# Patient Record
(demographics unavailable — no encounter records)

---

## 2025-05-09 NOTE — HISTORY OF PRESENT ILLNESS
[FreeTextEntry1] : YARED VELAZCO  is a 72 year old  M Annual clinical follow-up.  Interim echocardiogram was technically difficult right atrial enlargement normal right ventricular function aortic stenosis tricuspid regurgitation monitor RV size function.  Tricuspid disease.  Aortic disease.  Pulmonary pressures.  Monitor average heart rate 60s low burden of ectopy recent monitor and echo reviewed Today's EKG demonstrates normal sinus rhythm first-degree AV block prior infarct right bundle branch block.  Follow-up contrast echo.  Follow-up blood work.  No recent blood work.  Ischemic eval if any new symptoms. with a PMH of significant family history of coronary artery disease, dyslipidemia with low HDL, hypertension/HHD, DM, obesity and known coronary artery disease status post prior percutaneous revascularization LAD. Dilated aorta. RBBB. Abnormal EKG. Ebsteins anomaly.  Unable to tolerate cardiac MRI.  In the interim there have been no hospitalizations or procedures. Season is starting at the [facility].   Saw congenital heart specialist, Dr. Rishabh Graf Thyroid biopsy reportedly WNL.  Lab 10/2023 hg 15.6, k 4, creat 0.91, a1c 6.1, LDL66, tick disease panel neg, thyroid function nl Carotid 10/2023 mild nonobx stenosis BL thyroid nodule known CTA chest, abd, pelvis 2/26/20: No aortic aneurysm, dissection, or other acute aortic pathology. Nuclear stress test 2021 no ischemia Cardiac catheterization April 2011, LAD mid 80% tandem lesion, right coronary artery distal 40%, drug-eluting stent to LAD, note that the ascending aorta is uncoiled with no aortic insufficiency.  There is a systolic murmur on exam.  Recommend echocardiogram and blood work.  Continue antiplatelet beta-blocker and lipid-lowering therapy. Instructed to notify our office of any new symptoms. butch loss for long-term cardiovascular health.  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 Prior percutaneous revascularization to LAD 2011. Last stress test 1/2021 no ischemia. Monitor EF. Cont plavix, metoprolol, crestor.  Dilated aorta HTN/HHD, no CRI/CHF Cont norvac/benazepril, toprol. Low salt diet. Weight loss for long-term cardiovascular health.  HL High intensity statin rx. Note low HDL.  Thyroid nodule - status post biopsy. Reportedly stable. DM - following with endocrinologist. Followup with endocrinology regarding diabetes and thyroid  Any questions and concerns were addressed and resolved.

## 2025-06-24 NOTE — HISTORY OF PRESENT ILLNESS
[FreeTextEntry1] : YARED VELAZCO  is a 72 year old  M with a PMH of significant family history of coronary artery disease, dyslipidemia with low HDL, hypertension/HHD, DM, obesity and known coronary artery disease status post prior percutaneous revascularization LAD. Dilated aorta. RBBB. Abnormal EKG. Ebsteins anomaly. There have been symptoms of dizziness.  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  At that time he was brought to [facility].  Records reviewed.  There was reported bradycardia.  He metoprolol was stopped.  He was admitted overnight.  He was seen by [place] cardiology.  He reports having prior dizzy and vertigo episodes when changing position particularly at nighttime.  Follow-up event monitor.  Valve team eval.  Remain off metoprolol at this time.  Last visit echocardiogram was recommended.  Echo has normal left ventricular function there is known Lizabeth anomaly there is aortic stenosis parameters severe moderate tricuspid regurgitation borderline pulmonary pressures mean gradient 35 dimensionless index 0.26 blood work [facility] creatinine 0.9 LDL 64 A1c 6.2 TSH 0.7 hemoglobin 16.4 microalbumin 38 LP(a) within normal limits symptoms resolved in the emergency room.  Inpatient carotid was unremarkable.  Troponins were negative.  Orthostatics were reportedly negative.  Echocardiogram at [place] describes dimensionless index of 0.22 with a valve area less than 1 Unable to tolerate cardiac MRI.  In the interim there have been no hospitalizations or procedures. Season is starting at the [facility].  Saw congenital heart specialist, Dr. Rishabh Graf Thyroid biopsy reportedly WNL.  Annual clinical follow-up. \ nterim echocardiogram was technically difficult right atrial enlargement normal right ventricular function aortic stenosis tricuspid regurgitation  Monitor average heart rate 60s low burden of ectopy  Today's EKG demonstrates normal sinus rhythm first-degree AV block prior infarct right bundle branch block.  No recent blood work.   Lab 10/2023 hg 15.6, k 4, creat 0.91, a1c 6.1, LDL66, tick disease panel neg, thyroid function nl Carotid 10/2023 mild nonobx stenosis BL thyroid nodule known CTA chest, abd, pelvis 2/26/20: No aortic aneurysm, dissection, or other acute aortic pathology. Nuclear stress test 2021 no ischemia Cardiac catheterization April 2011, LAD mid 80% tandem lesion, right coronary artery distal 40%, drug-eluting stent to LAD, note that the ascending aorta is uncoiled with no aortic insufficiency.  monitor RV size function. Tricuspid disease. Aortic disease. Pulmonary pressures.  recent monitor and echo reviewed Follow-up contrast echo.  Follow-up blood work.  Ischemic eval if any new symptoms. There is a systolic murmur on exam.  Continue antiplatelet beta-blocker and lipid-lowering therapy. Instructed to notify our office of any new symptoms. butch loss for long-term cardiovascular health.  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 Prior percutaneous revascularization to LAD 2011. Last stress test 1/2021 no ischemia. Monitor EF. Cont plavix, metoprolol, crestor.  Dilated aorta HTN/HHD, no CRI/CHF Cont norvac/benazepril, toprol. Low salt diet. Weight loss for long-term cardiovascular health.  HL High intensity statin rx. Note low HDL.  Thyroid nodule - status post biopsy. Reportedly stable. DM - following with endocrinologist. Followup with endocrinology regarding diabetes and thyroid  Any questions and concerns were addressed and resolved.

## 2025-07-09 NOTE — REASON FOR VISIT
[Structural Heart and Valve Disease] : structural heart and valve disease [Hyperlipidemia] : hyperlipidemia [Hypertension] : hypertension [Coronary Artery Disease] : coronary artery disease [FreeTextEntry3] : Dr. Nesbitt

## 2025-07-09 NOTE — DISCUSSION/SUMMARY
[Patient] : the patient [With Me] : with me [FreeTextEntry3] : Post-cath [FreeTextEntry1] : 72M w/ PMH as above presenting for evaluation of symptomatic severe aortic stenosis.  He likely has a bicuspid aortic valve and will require a CTA to define the type of bicuspid as well as his aortic measurement.  We will have a heart team approach and discuss the best option for him.  1. AS - plan on cardiac cath, CT Sx consultation - Dr. Foy, TAVR CTA.  2. CAD - cath as above, continue  plavix 75 mg PO daily. 3. HTN - continue metoprol XL 25 mg PO daily and amlodipine-benazepril 5-20 mg PO daily. 4. HLD - continue rosuvastatin 20 mg PO QHS.

## 2025-07-09 NOTE — PHYSICAL EXAM

## 2025-07-09 NOTE — HISTORY OF PRESENT ILLNESS
[FreeTextEntry1] : 72M w/ PMH of HTN, HLD, aortic dilation, bicsupid aortic valve with severe stenosis, Ebstein's anomaly and CAD s/p prior stent placement presents for TAVR evaluation.  He denies anginal chest pains and INIGUEZ but does report an episode of pre-syncope.  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

## 2025-07-09 NOTE — PHYSICAL EXAM

## 2025-07-15 NOTE — HISTORY OF PRESENT ILLNESS
[FreeTextEntry1] : Mr. YARED VELAZCO is a 72 year old male referred by Kalia Westbrook & Sandra for Aortic Stenosis.    He has a PMH pertinent for HTN, HLD, Aortic Dilation, Bicuspid Aortic Valve, Ebsteins Anomaly, CAD (s/p Stent) and severe Aortic Stenosis.     He presents today for TAVR Candidacy.    NYHA Class

## 2025-07-15 NOTE — DATA REVIEWED
[FreeTextEntry1] : .tte0d 6/16/25 1. Technically difficult image quality. 2. Left ventricular systolic function is normal with an ejection fraction visually estimated at 60 to 65 %. 3. Atrialization of the right ventricle, tricuspid valve annulus to mitral valve annulus = 3.6 cm consistent with Ebstein's Anomaly. 4. The right atrium is severely dilated. 5. Aortic valve anatomy cannot be determined with reduced systolic excursion. There is calcification of the aortic valve leaflets. Moderate to severe aortic stenosis. 6. Trace mitral regurgitation. 7. Moderate tricuspid regurgitation. 8. Pulmonic valve was not well visualized. 9. Estimated pulmonary artery systolic pressure is 33 mmHg, consistent with borderline pulmonary hypertension. 10. No pericardial effusion seen. 11. Compared to the transthoracic echocardiogram performed on 9/18/2024, there have been no significant interval changes. 12. Consider JEREMIAS to better quantify aortic stenosis and rule out pfo/asd.

## 2025-07-15 NOTE — ASSESSMENT
[FreeTextEntry1] : I have independently reviewed the medical records and imaging at the time of this office consultation and discussed the following interpretations with Mr. VELAZCO:   After review of the TTE, Mr. VELAZCO is noted to have severe aortic stenosis. The risks and benefits of both Surgical Aortic Valve Replacement versus Transcatheter Aortic Valve Replacement (TAVR) have been explained and he would like to proceed with further workup and consideration for TAVR by the Structural Heart Team.   Risks, benefits and alternatives to TAVR were discussed with the patient in detail. Risks discussed included, but not limited to, infection, bleeding, myocardial infarction, cerebrovascular accident, renal failure, vascular injury requiring intervention, cardiac rupture and death. In addition, a roughly 5-10% risk of significant heart block requiring permanent pacemaker implantation was highlighted.   During this visit a shared decision-making process was utilized and included Mr. VELAZCO, his family, and the available options.  Surgical options, transcatheter options and alternatives to surgery were discussed. Mr. VELAZCO's values and preferences were considered. He fully understands and wishes to proceed. All questions were answered to his understanding and satisfaction.   Prior to TAVR, Mr. VELAZCO will require a dedicated TAVR CT scan. The purpose of this CT scan is to evaluate the size of the aortic valve and annulus, and measure peripheral vessel diameters to determine feasibility for transcatheter access for the TAVR, and measure of alternate access options if transfemoral is not feasible.  Once the TAVR CT scan is complete the scan will be reviewed by a multidisciplinary team of interventional cardiologist, cardiac surgeons and PAs and NPs, to plan the best approach for the surgical procedure.   PREOPERATIVE CHECKLIST: (Discussed with patient)  - Confirm allergies, including latex: - Confirm pacemaker: - Anticoagulation/antiplatelets noted and will be discontinued/continued: - SGLT-2 Inhibitors (discontinued 3 days prior to surgery) or GLP-1 (discontinued 1 week prior to surgery): - All other supplements, NSAIDs and fish oil were discussed and will be held one week before surgery   PLAN: - TAVR CTA - Cardiac Catheterization, to assess the Coronary arteries- ordered by Dr. Flores  - Plan for

## 2025-07-18 NOTE — PHYSICAL EXAM
[General Appearance - Alert] : alert [General Appearance - In No Acute Distress] : in no acute distress [Sclera] : the sclera and conjunctiva were normal [PERRL With Normal Accommodation] : pupils were equal in size, round, and reactive to light [Outer Ear] : the ears and nose were normal in appearance [Neck Appearance] : the appearance of the neck was normal [Respiration, Rhythm And Depth] : normal respiratory rhythm and effort [Exaggerated Use Of Accessory Muscles For Inspiration] : no accessory muscle use [Auscultation Breath Sounds / Voice Sounds] : lungs were clear to auscultation bilaterally [IV] : a grade 4 [Abnormal Walk] : normal gait [Musculoskeletal - Swelling] : no joint swelling seen [Skin Color & Pigmentation] : normal skin color and pigmentation [Skin Turgor] : normal skin turgor [] : no rash [Skin Lesions] : no skin lesions [No Focal Deficits] : no focal deficits [Oriented To Time, Place, And Person] : oriented to person, place, and time [FreeTextEntry1] : wears glasses

## 2025-07-18 NOTE — DATA REVIEWED
[FreeTextEntry1] :  Transthoracic Echocardiogram 6/16/25 1. Technically difficult image quality. 2. Left ventricular systolic function is normal with an ejection fraction visually estimated at 60 to 65 %. 3. Atrialization of the right ventricle, tricuspid valve annulus to mitral valve annulus = 3.6 cm consistent with Ebstein's Anomaly. 4. The right atrium is severely dilated. 5. Aortic valve anatomy cannot be determined with reduced systolic excursion. There is calcification of the aortic valve leaflets. Moderate to severe aortic stenosis. 6. Trace mitral regurgitation. 7. Moderate tricuspid regurgitation. 8. Pulmonic valve was not well visualized. 9. Estimated pulmonary artery systolic pressure is 33 mmHg, consistent with borderline pulmonary hypertension. 10. No pericardial effusion seen. 11. Compared to the transthoracic echocardiogram performed on 9/18/2024, there have been no significant interval changes. 12. Consider JEREMIAS to better quantify aortic stenosis and rule out pfo/asd.

## 2025-07-18 NOTE — ASSESSMENT
[FreeTextEntry1] : I have independently reviewed the medical records and imaging at the time of this office consultation and discussed the following interpretations with Mr. VELAZCO:   After review of the TTE, Mr. VELAZCO is noted to have moderate to severe aortic stenosis. I believe he may have a bicuspid Aortic Valve so we are going to proceed with testing for TAVR to further evaluate the Aortic Valve. We will need to review his CT Scan and Cardiac Catheterization to determine if his Aortic Valve is amenable to TAVR. He is also on the cusp of severe Aortic Stenosis since his valve is currently in the moderate range.  Also, we need to determine if his Tricuspid valve needs intervention. If the valve is moderate we will follow TTEs until he is severe. If the Aortic Valve is Severe then we will plan for TAVR.  If the Aortic Valve is not suitable for TAVR then we will discuss his options for open heart surgery.    The risks and benefits of both Surgical Aortic Valve Replacement versus Transcatheter Aortic Valve Replacement (TAVR) have been explained and he would like to proceed with further workup and consideration for TAVR by the Structural Heart Team.   We did discuss risks, benefits and alternatives to TAVR. Risks discussed included, but not limited to, infection, bleeding, myocardial infarction, cerebrovascular accident, renal failure, vascular injury requiring intervention, cardiac rupture and death. In addition, a roughly 5-10% risk of significant heart block requiring permanent pacemaker implantation was highlighted.   During this visit a shared decision-making process was utilized and included Mr. VELAZCO, his family, and the available options.  Surgical options, transcatheter options and alternatives to surgery were discussed. Mr. VELAZCO's values and preferences were considered. He fully understands and wishes to proceed. All questions were answered to his understanding and satisfaction.   Prior to TAVR, Mr. VELAZCO will require a dedicated TAVR CT scan. The purpose of this CT scan is to evaluate the size of the aortic valve and annulus, and measure peripheral vessel diameters to determine feasibility for transcatheter access for the TAVR, and measure of alternate access options if transfemoral is not feasible.  Once the TAVR CT scan is complete the scan will be reviewed by a multidisciplinary team of interventional cardiologist, cardiac surgeons and PAs and NPs, to plan the best approach for the surgical procedure.   PREOPERATIVE CHECKLIST: (Discussed with patient)  - Confirm allergies, including latex: None - Confirm pacemaker: None - Anticoagulation/antiplatelets noted and will be discontinued/continued: Plavix - Maintain  - SGLT-2 Inhibitors (discontinued 3 days prior to surgery) or GLP-1 (discontinued 1 week prior to surgery): None  - All other supplements, NSAIDs and fish oil were discussed and will be held one week before surgery   PLAN: - TAVR CTA - Cardiac Catheterization, to assess the Coronary arteries- ordered by Dr. Flores  - Return to [facility] to discuss results  I, Dr. Foy, personally performed the evaluation and management (E/M) services for this new patient.  That E/M includes conducting the initial examination, assessing all conditions, and establishing the plan of care.  Today, Chloe Talavera, was here to observe my evaluation and management services for this patient to be followed going forward.

## 2025-07-18 NOTE — HISTORY OF PRESENT ILLNESS
[FreeTextEntry1] : Mr. YARED VELAZCO is a 72 year old male referred by Kalia Westbrook & Sandra for Aortic Stenosis.    He has a PMH pertinent for HTN, HLD, Aortic Dilation, Bicuspid Aortic Valve, Ebsteins Anomaly, CAD (s/p Stent 4/1/2011) and severe Aortic Stenosis.     He presents today for TAVR Candidacy. He had an episode of dizziness & vertigo on 6/10/25 and he went to [facility] where he was found to have a very low heart rate. He states overall he feels "pretty good". He has dyspnea on exertion and fatigues easily, but he works on his feet and does steps for his job but since February he has lost weight that has improved his symptoms. Mr. YARED VELAZCO  denies any chest pain, palpitations, syncope, orthopnea, cough, or lower extremity edema.    NYHA Class II.  He lives alone with 2 tenants, but is part of a [place] community so has help available.  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 After his vertigo episode he had a fall in a parking lot unrelated to the vertigo but he has some shoulder pain.

## 2025-07-18 NOTE — HISTORY OF PRESENT ILLNESS
[FreeTextEntry1] : Mr. YARED VELAZCO is a 72 year old male referred by Kalia Westbrook & Sandra for Aortic Stenosis.    He has a PMH pertinent for HTN, HLD, Aortic Dilation, Bicuspid Aortic Valve, Ebsteins Anomaly, CAD (s/p Stent 4/1/2011) and severe Aortic Stenosis.     He presents today for TAVR Candidacy. He had an episode of dizziness & vertigo on 6/10/25 and he went to [facility] where he was found to have a very low heart rate. He states overall he feels "pretty good". He has dyspnea on exertion and fatigues easily, but he works on his feet and does steps for his job but since February he has lost weight that has improved his symptoms. Mr. YARED VELAZCO  denies any chest pain, palpitations, syncope, orthopnea, cough, or lower extremity edema.    NYHA Class II.  He lives alone with 2 tenants, but is part of a [place] community so has help available.  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 After his vertigo episode he had a fall in a parking lot unrelated to the vertigo but he has some shoulder pain.

## 2025-07-29 NOTE — HISTORY OF PRESENT ILLNESS
[FreeTextEntry1] : 72M w/ PMH of HTN, HLD, aortic dilation, bicsupid aortic valve with severe stenosis, Ebstein's anomaly and CAD s/p prior stent placement presents for TAVR evaluation.  He denies anginal chest pains and INIGUEZ but does report an episode of pre-syncope.  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   7/29/2025: S/P cardiac cath with widely patent stent and a 40 mm LV-Ao gradient.  Recovering well and without access site issues.

## 2025-07-29 NOTE — PHYSICAL EXAM
[Well Developed] : well developed [Well Nourished] : well nourished [No Acute Distress] : no acute distress [Normal Conjunctiva] : normal conjunctiva [Normal Venous Pressure] : normal venous pressure [No Carotid Bruit] : no carotid bruit [Normal S1, S2] : normal S1, S2 [No Rub] : no rub [No Gallop] : no gallop [Murmur] : murmur [Clear Lung Fields] : clear lung fields [Good Air Entry] : good air entry [No Respiratory Distress] : no respiratory distress  [Soft] : abdomen soft [Non Tender] : non-tender [No Masses/organomegaly] : no masses/organomegaly [Normal Bowel Sounds] : normal bowel sounds [Normal Gait] : normal gait [No Edema] : no edema [No Cyanosis] : no cyanosis [No Clubbing] : no clubbing [No Varicosities] : no varicosities [No Rash] : no rash [No Skin Lesions] : no skin lesions [Moves all extremities] : moves all extremities [No Focal Deficits] : no focal deficits [Normal Speech] : normal speech [Alert and Oriented] : alert and oriented [Normal memory] : normal memory [de-identified] : 2/6 systolic murmur over the 2nd right intercostal space, late peaking.  [de-identified] : Right radial cath site c/d/i. 2+ pulse with ulnar occlusion. No hematoma or swelling.

## 2025-07-29 NOTE — DISCUSSION/SUMMARY
[Patient] : the patient [With Me] : with me [FreeTextEntry1] : 72M w/ PMH as above presenting for evaluation of symptomatic severe aortic stenosis.  Cath revealed a patent coronary stent and significant LV-Ao gradient.  TAVR CTA was performed yesterday and the results are pending.   1. AS - awaiting Heart Team meeting.  2. CAD - CCS Class 0, continue  plavix 75 mg PO daily. 3. HTN - continue metoprol XL 25 mg PO daily and amlodipine-benazepril 5-20 mg PO daily. 4. HLD - continue rosuvastatin 20 mg PO QHS.  RTC Late September post-valve and then to Dr. Westbrook thereafter [EKG obtained to assist in diagnosis and management of assessed problem(s)] : EKG obtained to assist in diagnosis and management of assessed problem(s)